# Patient Record
Sex: FEMALE | Race: WHITE | Employment: FULL TIME | URBAN - METROPOLITAN AREA
[De-identification: names, ages, dates, MRNs, and addresses within clinical notes are randomized per-mention and may not be internally consistent; named-entity substitution may affect disease eponyms.]

---

## 2023-11-16 ENCOUNTER — OFFICE VISIT (OUTPATIENT)
Age: 43
End: 2023-11-16

## 2023-11-16 VITALS
BODY MASS INDEX: 41.41 KG/M2 | SYSTOLIC BLOOD PRESSURE: 120 MMHG | WEIGHT: 225 LBS | DIASTOLIC BLOOD PRESSURE: 82 MMHG | TEMPERATURE: 98.1 F | HEIGHT: 62 IN | HEART RATE: 84 BPM | OXYGEN SATURATION: 98 %

## 2023-11-16 DIAGNOSIS — M79.604 LEG PAIN, RIGHT: Primary | ICD-10-CM

## 2023-11-16 ASSESSMENT — ENCOUNTER SYMPTOMS
ABDOMINAL PAIN: 0
COLOR CHANGE: 0
VOMITING: 0
COUGH: 0
SHORTNESS OF BREATH: 0
NAUSEA: 0

## 2023-11-16 NOTE — PROGRESS NOTES
Teresa Pablo (:  1980) is a 41 y.o. female, New patient, here for evaluation of the following chief complaint(s):  Leg Pain (Right calf pain after skipping and heard a pop)    ASSESSMENT/PLAN:    ICD-10-CM    1. Leg pain, right  M79.604 XR TIBIA FIBULA RIGHT (2 VIEWS)        POC testing: Discussed result(s) with patient  No results found for this visit on 23. Imaging:   Initial read of right tib/fibula xray done by myself: no fracture. Will call if radiologists read is different from my initial read. reviewed and interpreted by radiologist as above showing:   Xray Result (most recent): Discussed result(s) with patient  XR TIBIA FIBULA RIGHT (2 VIEWS) 2023    Narrative  EXAMINATION:  2 XRAY VIEWS OF THE RIGHT TIBIA AND FIBULA    2023 6:01 pm    COMPARISON:  None. HISTORY:  ORDERING SYSTEM PROVIDED HISTORY: Leg pain, right  TECHNOLOGIST PROVIDED HISTORY:  Reason for exam:->right lower leg pain. TTP    FINDINGS:  Negative for fracture or dislocation. No lytic lesions. Mild soft tissue  swelling. No lytic destructive lesions. Impression  No osseous abnormality    Ddx includes: Right tibia or fibula fracture, Muscle strain, DVT  Disposition: Pt is 43yo female here for eval of right lower leg pain x5hs. VSS. Physical Exam as below. Xray completed with no acute fractures or dislocations per radiologist. Jeremiah Vancourt and to f/u with PCP or ortho if no improvement in 1wk or sooner for worsening s/s. Reviewed AVS with patient. All questions answered. If symptoms worsen go to the Emergency Department. Follow up in clinic or with PCP as needed. Patient is agreeable with plan. SUBJECTIVE/OBJECTIVE:  Pt is 43yo female, , who presents to clinic with c/o right leg pain x5hrs. She reports as she was kipping in the hallway she heard from her posterior with immediate pain. States she went home and tried elevation, ice and Ibuprofen with no improvement.  Denies any prior injury,